# Patient Record
Sex: MALE | Race: WHITE | NOT HISPANIC OR LATINO
[De-identification: names, ages, dates, MRNs, and addresses within clinical notes are randomized per-mention and may not be internally consistent; named-entity substitution may affect disease eponyms.]

---

## 2020-02-07 PROBLEM — Z00.00 ENCOUNTER FOR PREVENTIVE HEALTH EXAMINATION: Status: ACTIVE | Noted: 2020-02-07

## 2020-02-10 ENCOUNTER — APPOINTMENT (OUTPATIENT)
Dept: HEMATOLOGY ONCOLOGY | Facility: CLINIC | Age: 60
End: 2020-02-10
Payer: COMMERCIAL

## 2020-02-10 VITALS
RESPIRATION RATE: 18 BRPM | DIASTOLIC BLOOD PRESSURE: 96 MMHG | TEMPERATURE: 97.8 F | SYSTOLIC BLOOD PRESSURE: 151 MMHG | BODY MASS INDEX: 29.9 KG/M2 | HEART RATE: 77 BPM | OXYGEN SATURATION: 96 % | HEIGHT: 74 IN | WEIGHT: 233 LBS

## 2020-02-10 DIAGNOSIS — Z86.39 PERSONAL HISTORY OF OTHER ENDOCRINE, NUTRITIONAL AND METABOLIC DISEASE: ICD-10-CM

## 2020-02-10 DIAGNOSIS — Z80.42 FAMILY HISTORY OF MALIGNANT NEOPLASM OF PROSTATE: ICD-10-CM

## 2020-02-10 DIAGNOSIS — Z86.79 PERSONAL HISTORY OF OTHER DISEASES OF THE CIRCULATORY SYSTEM: ICD-10-CM

## 2020-02-10 DIAGNOSIS — Z78.9 OTHER SPECIFIED HEALTH STATUS: ICD-10-CM

## 2020-02-10 DIAGNOSIS — Z87.891 PERSONAL HISTORY OF NICOTINE DEPENDENCE: ICD-10-CM

## 2020-02-10 DIAGNOSIS — K92.9 DISEASE OF DIGESTIVE SYSTEM, UNSPECIFIED: ICD-10-CM

## 2020-02-10 PROCEDURE — 99205 OFFICE O/P NEW HI 60 MIN: CPT

## 2020-02-10 RX ORDER — PRAVASTATIN SODIUM 20 MG/1
20 TABLET ORAL
Refills: 0 | Status: ACTIVE | COMMUNITY

## 2020-02-10 RX ORDER — CROMOLYN SODIUM 40 MG/ML
4 SOLUTION/ DROPS OPHTHALMIC
Refills: 0 | Status: ACTIVE | COMMUNITY

## 2020-02-10 RX ORDER — CYANOCOBALAMIN (VITAMIN B-12) 1000 MCG
1000 TABLET ORAL
Refills: 0 | Status: ACTIVE | COMMUNITY

## 2020-02-10 RX ORDER — TAMSULOSIN HYDROCHLORIDE 0.4 MG/1
0.4 CAPSULE ORAL
Refills: 0 | Status: ACTIVE | COMMUNITY

## 2020-02-10 NOTE — PHYSICAL EXAM
[Restricted in physically strenuous activity but ambulatory and able to carry out work of a light or sedentary nature] : Status 1- Restricted in physically strenuous activity but ambulatory and able to carry out work of a light or sedentary nature, e.g., light house work, office work [de-identified] : Irregularity overlying the lateral aspect of the arch of the left foot

## 2020-02-10 NOTE — HISTORY OF PRESENT ILLNESS
[de-identified] : This is a very pleasant 60-year-old gentleman with the below past medical history who developed right leg and foot pain approximately 1-1/2-2 months ago.  He was referred to podiatry who made adjustments in his medications. His Norvasc was discontinued which appear to improve the right foot pain significantly. Simultaneously he complained of a growth on the arch of his left foot. This was further evaluated by his podiatrist with imaging.  An MRI of the left foot performed on 10/6/20 revealed on the medial aspect of the left foot arch there was a well-circumscribed, T2 hyperintense diffusely enhancing nodular lesion embedded within the adductor hallucis brevis muscle belly.  It measured 1.2 x 1.5 x 2.9 cm.  The question of a sarcoma was raised and he was therefore referred to me for further evaluation and management of this.  He denies fevers, chills, night sweats, loss of appetite or weight, lightheadedness/dizziness, chest pain, chest palpitations, shortness of breath, abdominal pain, nausea, vomiting, diarrhea, signs of blood loss including melena, new lower extremity swelling pain, new rashes/ecchymoses/petechiae.  He reports a relatively normal EGD and colonoscopy approximately 3 months ago under the care of Dr. Lozano. [0 - No Distress] : Distress Level: 0

## 2020-02-10 NOTE — ASSESSMENT
[FreeTextEntry1] : This is a very pleasant 60-year-old gentleman with a newly diagnosed mass in the left foot embedded in the abductor hallucis brevis muscle belly raising the question of a sarcoma. I have personally reviewed his scans with Dr. Song of interventional radiology who will make arrangements for the patient to undergo biopsy paying special attention to the consideration of a sarcoma. I will obtain a CBC with differential, CMP, and coagulation studies for him to be able to undergo this biopsy.  Further recommendations will depend on the results of this biopsy.\par He will return to office in 3 weeks, as he is traveling to [facility] next week to celebrate the birthday of his grandson. I hope to have the biopsy performed before he leaves and the results available upon his return.\par \par Thank you very much for allowing me to participate in this gentleman's medical care. Should you have any questions or concerns please do not hesitate to call me directly.

## 2020-03-02 PROBLEM — M79.89 SOFT TISSUE LESION OF FOOT: Status: ACTIVE | Noted: 2020-02-10

## 2020-03-03 ENCOUNTER — APPOINTMENT (OUTPATIENT)
Dept: HEMATOLOGY ONCOLOGY | Facility: CLINIC | Age: 60
End: 2020-03-03
Payer: COMMERCIAL

## 2020-03-03 VITALS
BODY MASS INDEX: 29.77 KG/M2 | RESPIRATION RATE: 18 BRPM | TEMPERATURE: 98 F | WEIGHT: 232 LBS | SYSTOLIC BLOOD PRESSURE: 146 MMHG | DIASTOLIC BLOOD PRESSURE: 90 MMHG | OXYGEN SATURATION: 96 % | HEART RATE: 76 BPM | HEIGHT: 74 IN

## 2020-03-03 DIAGNOSIS — M79.89 OTHER SPECIFIED SOFT TISSUE DISORDERS: ICD-10-CM

## 2020-03-03 PROCEDURE — 99214 OFFICE O/P EST MOD 30 MIN: CPT

## 2020-03-03 NOTE — HISTORY OF PRESENT ILLNESS
[0 - No Distress] : Distress Level: 0 [de-identified] : This is a very pleasant 60-year-old gentleman with the below past medical history who developed right leg and foot pain approximately 1-1/2-2 months ago.  He was referred to podiatry who made adjustments in his medications. His Norvasc was discontinued which appear to improve the right foot pain significantly. Simultaneously he complained of a growth on the arch of his left foot. This was further evaluated by his podiatrist with imaging.  An MRI of the left foot performed on 10/6/20 revealed on the medial aspect of the left foot arch there was a well-circumscribed, T2 hyperintense diffusely enhancing nodular lesion embedded within the adductor hallucis brevis muscle belly.  It measured 1.2 x 1.5 x 2.9 cm.  The question of a sarcoma was raised and he was therefore referred to me for further evaluation and management of this.  He denies fevers, chills, night sweats, loss of appetite or weight, lightheadedness/dizziness, chest pain, chest palpitations, shortness of breath, abdominal pain, nausea, vomiting, diarrhea, signs of blood loss including melena, new lower extremity swelling pain, new rashes/ecchymoses/petechiae.  He reports a relatively normal EGD and colonoscopy approximately 3 months ago under the care of Dr. Lozano. 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  No new complaints after the biopsy.

## 2020-03-03 NOTE — PHYSICAL EXAM
[Restricted in physically strenuous activity but ambulatory and able to carry out work of a light or sedentary nature] : Status 1- Restricted in physically strenuous activity but ambulatory and able to carry out work of a light or sedentary nature, e.g., light house work, office work [de-identified] : Irregularity overlying the lateral aspect of the arch of the left foot

## 2020-03-03 NOTE — ASSESSMENT
[FreeTextEntry1] : This is a very pleasant 60-year-old gentleman with a newly diagnosed mass in the left foot embedded in the abductor hallucis brevis muscle belly raising the question of a sarcoma. I have personally reviewed his scans with Dr. Song of interventional radiology and made arrangements for the patient to undergo biopsy paying special attention to the consideration of a sarcoma.  This was performed on February 13, 2020 and showed a fibrocellular proliferation with dense collagen suggestive of fibromatosis. There is also a focus of dystrophic calcification present.   I reviewed this personally with the pathologist, Dr. Gómez, who reports there are no areas in the biopsy that are worrisome for a fibrosarcoma, desmoid tumor or other more worrisome pathology.  He will f/u with Dr. Tovar to discuss further management options.  He will return in 3 months for f/u.

## 2020-06-16 ENCOUNTER — APPOINTMENT (OUTPATIENT)
Dept: HEMATOLOGY ONCOLOGY | Facility: CLINIC | Age: 60
End: 2020-06-16